# Patient Record
Sex: MALE | NOT HISPANIC OR LATINO | Employment: OTHER | URBAN - METROPOLITAN AREA
[De-identification: names, ages, dates, MRNs, and addresses within clinical notes are randomized per-mention and may not be internally consistent; named-entity substitution may affect disease eponyms.]

---

## 2017-10-17 ENCOUNTER — OFFICE VISIT (OUTPATIENT)
Dept: FAMILY MEDICINE | Facility: CLINIC | Age: 67
End: 2017-10-17
Payer: MEDICARE

## 2017-10-17 VITALS
TEMPERATURE: 96 F | HEIGHT: 69 IN | SYSTOLIC BLOOD PRESSURE: 130 MMHG | RESPIRATION RATE: 16 BRPM | WEIGHT: 177.5 LBS | DIASTOLIC BLOOD PRESSURE: 82 MMHG | BODY MASS INDEX: 26.29 KG/M2 | HEART RATE: 81 BPM | OXYGEN SATURATION: 98 %

## 2017-10-17 DIAGNOSIS — I10 HYPERTENSION, UNSPECIFIED TYPE: Primary | ICD-10-CM

## 2017-10-17 DIAGNOSIS — Z23 NEED FOR PROPHYLACTIC VACCINATION AGAINST STREPTOCOCCUS PNEUMONIAE (PNEUMOCOCCUS): ICD-10-CM

## 2017-10-17 DIAGNOSIS — J30.2 SEASONAL ALLERGIC RHINITIS, UNSPECIFIED CHRONICITY, UNSPECIFIED TRIGGER: ICD-10-CM

## 2017-10-17 DIAGNOSIS — E78.5 HYPERLIPIDEMIA, UNSPECIFIED HYPERLIPIDEMIA TYPE: ICD-10-CM

## 2017-10-17 PROCEDURE — 90670 PCV13 VACCINE IM: CPT | Mod: PBBFAC,PO

## 2017-10-17 PROCEDURE — G0008 ADMIN INFLUENZA VIRUS VAC: HCPCS | Mod: PBBFAC,PO

## 2017-10-17 PROCEDURE — G0009 ADMIN PNEUMOCOCCAL VACCINE: HCPCS | Mod: PBBFAC,PO

## 2017-10-17 PROCEDURE — 99999 PR PBB SHADOW E&M-NEW PATIENT-LVL IV: CPT | Mod: PBBFAC,,, | Performed by: FAMILY MEDICINE

## 2017-10-17 PROCEDURE — 99204 OFFICE O/P NEW MOD 45 MIN: CPT | Mod: PBBFAC,PO | Performed by: FAMILY MEDICINE

## 2017-10-17 PROCEDURE — 99204 OFFICE O/P NEW MOD 45 MIN: CPT | Mod: 25,S$PBB,, | Performed by: FAMILY MEDICINE

## 2017-10-17 RX ORDER — GABAPENTIN 100 MG/1
CAPSULE ORAL 3 TIMES DAILY
COMMUNITY
Start: 2017-10-02

## 2017-10-17 RX ORDER — HYDROCHLOROTHIAZIDE 12.5 MG/1
12.5 TABLET ORAL DAILY
COMMUNITY

## 2017-10-17 RX ORDER — VALSARTAN 320 MG/1
320 TABLET ORAL
COMMUNITY

## 2017-10-17 RX ORDER — ATORVASTATIN CALCIUM 20 MG/1
TABLET, FILM COATED ORAL NIGHTLY
COMMUNITY
Start: 2017-09-01

## 2017-10-17 RX ORDER — AMITRIPTYLINE HYDROCHLORIDE 25 MG/1
25 TABLET, FILM COATED ORAL
COMMUNITY

## 2017-10-17 RX ORDER — PANTOPRAZOLE SODIUM 40 MG/1
TABLET, DELAYED RELEASE ORAL
COMMUNITY
Start: 2017-10-02

## 2017-10-17 RX ORDER — MONTELUKAST SODIUM 10 MG/1
TABLET ORAL NIGHTLY
COMMUNITY
Start: 2017-09-01 | End: 2017-11-02

## 2017-10-17 RX ORDER — METOPROLOL TARTRATE 25 MG/1
25 TABLET, FILM COATED ORAL DAILY
COMMUNITY
End: 2017-10-17 | Stop reason: SINTOL

## 2017-10-17 RX ORDER — AMLODIPINE BESYLATE 5 MG/1
5 TABLET ORAL DAILY
Qty: 30 TABLET | Refills: 5 | Status: SHIPPED | OUTPATIENT
Start: 2017-10-17 | End: 2018-10-17

## 2017-10-17 NOTE — PROGRESS NOTES
Subjective:       Patient ID: Donis Jones is a 67 y.o. male.    Chief Complaint: Establish Care    Mr. Jones comes in today as a new patient to establish care with me.  He states he has been previously followed by PCP/NP Monet Regalado with [facility] with last visit on September 1, 2017.      He states he follows with Jonn Dee-Dr. Damien Ray, ENT, whom he states told him/recommended he not take beta blockers or alpha blockers due to rhinitis and advised him he suspected metoprolol may be the cause for his rhinitis.  He reports having headache for at least 3 months due to sinus symptoms.  He reports associated fatigue, reddened, sore watery and itchy eyes, rare sneezing and sore throat.  He denies fever, chills, appetite change; other sinus symptoms; shortness of breath, cough, wheezing; chest pain, palpitations, leg swelling; abdominal pain, nausea, vomiting, diarrhea, constipation; unusual urinary symptoms; back pain; anxiety, depression, homicidal or suicidal thoughts; hot or cold intolerance.    He has lab results with him which shows the following:  September 1, 2017 - WBC 4.8, hemoglobin 16, hematocrit 47.8, platelet count 225,000, glucose 91, BUN 20, creatinine 1.20, sodium 139, potassium 4.2, chloride 100, CO2 32, calcium 10, total protein 7.9, albumin 4.9, globulin 3.0, alkaline phosphatase 73, ALT 24, AST 19, total bilirubin 0.9, magnesium 2.3, total cholesterol 266, triglyceride 89, HDL 71,  (not compliant with Lipitor therapy), EGFR 74.    Past Medical History:  No date: DDD (degenerative disc disease), lumbar  No date: GERD (gastroesophageal reflux disease)  No date: High cholesterol  No date: HTN (hypertension)  No date: Insomnia  08/2012: Prostate cancer - follows with urologist Dr. Connors  No date: Seasonal allergies      Current Outpatient Prescriptions:  amitriptyline (ELAVIL) 25 MG tablet, Take 25 mg by mouth.  atorvastatin (LIPITOR) 20 MG tablet, nightly.  gabapentin  (NEURONTIN) 100 MG capsule, 3 (three) times daily.  hydrochlorothiazide (HYDRODIURIL) 12.5 MG Tab, Take 12.5 mg by mouth once daily.  metoprolol tartrate (LOPRESSOR) 25 MG tablet, Take 25 mg by mouth once daily.  montelukast (SINGULAIR) 10 mg tablet, nightly.  pantoprazole (PROTONIX) 40 MG tablet,   valsartan (DIOVAN) 320 MG tablet, Take 320 mg by mouth.            Review of Systems   Constitutional: Positive for fatigue. Negative for activity change, appetite change, chills and fever.   HENT: Positive for congestion, postnasal drip, sinus pressure, sneezing and sore throat. Negative for rhinorrhea.    Eyes: Positive for pain, discharge, redness and itching.   Respiratory: Negative for cough, shortness of breath, wheezing and stridor.    Cardiovascular: Negative for chest pain, palpitations and leg swelling.   Gastrointestinal: Negative for abdominal pain, constipation, diarrhea, nausea and vomiting.   Endocrine: Negative for cold intolerance and heat intolerance.   Genitourinary: Negative for difficulty urinating.   Musculoskeletal: Negative for back pain.   Neurological: Positive for headaches.   Psychiatric/Behavioral: Negative for dysphoric mood and suicidal ideas. The patient is not nervous/anxious.         Negative for homicidal ideas.       Objective:      Physical Exam   Constitutional: He is oriented to person, place, and time. He appears well-developed and well-nourished. No distress.   Pleasant.   HENT:   Head: Normocephalic and atraumatic.   Right Ear: External ear normal.   Left Ear: External ear normal.   Nose: Nose normal.   Mouth/Throat: Oropharynx is clear and moist. No oropharyngeal exudate.   Slightly tender frontal sinus.  TM's shiny and clear. Nasal mucosa pink, slightly congested without drainage noted.   Eyes: Conjunctivae and EOM are normal. Pupils are equal, round, and reactive to light. Right eye exhibits no discharge. Left eye exhibits no discharge.   Neck: Normal range of motion. Neck  supple. No thyromegaly present.   Cardiovascular: Normal rate, regular rhythm, normal heart sounds and intact distal pulses.    No murmur heard.  Pulmonary/Chest: Effort normal and breath sounds normal. No respiratory distress. He has no wheezes.   Abdominal: Soft. Bowel sounds are normal. He exhibits no distension and no mass. There is no tenderness. There is no guarding.   Musculoskeletal: Normal range of motion. He exhibits no edema or tenderness.   He is ambulatory without problems.   Lymphadenopathy:     He has no cervical adenopathy.   Neurological: He is alert and oriented to person, place, and time.   Skin: He is not diaphoretic.   Psychiatric: He has a normal mood and affect. His behavior is normal. Judgment and thought content normal.   Vitals reviewed.      Assessment:       1. Hypertension, unspecified type    2. Hyperlipidemia, unspecified hyperlipidemia type    3. Seasonal allergic rhinitis, unspecified chronicity, unspecified trigger    4. Need for prophylactic vaccination against Streptococcus pneumoniae (pneumococcus)        Plan:       1.  Stop Metoprolol.  Start Amlodipine 5 mg daily, #30, 5 refills; medication precautions discussed with patient.  2.  Continue current medications, follow low sodium, low cholesterol, low carb diet, daily walks.  3.  High dose flu shot today.  4.  Prevnar-13 shot today. Pneumovax in 1 year.  5.  See me for hyperlipidemia and hypertension follow up (at same time as follow up with Dr. Connors).

## 2017-11-02 ENCOUNTER — OFFICE VISIT (OUTPATIENT)
Dept: FAMILY MEDICINE | Facility: CLINIC | Age: 67
End: 2017-11-02
Payer: MEDICARE

## 2017-11-02 VITALS
SYSTOLIC BLOOD PRESSURE: 121 MMHG | OXYGEN SATURATION: 96 % | RESPIRATION RATE: 16 BRPM | DIASTOLIC BLOOD PRESSURE: 81 MMHG | TEMPERATURE: 97 F | HEIGHT: 69 IN | HEART RATE: 91 BPM | BODY MASS INDEX: 26.48 KG/M2 | WEIGHT: 178.81 LBS

## 2017-11-02 DIAGNOSIS — J31.0 CHRONIC RHINITIS, UNSPECIFIED TYPE: Primary | ICD-10-CM

## 2017-11-02 DIAGNOSIS — J34.89 SINUS PAIN: ICD-10-CM

## 2017-11-02 DIAGNOSIS — I10 ESSENTIAL HYPERTENSION: ICD-10-CM

## 2017-11-02 PROCEDURE — 99214 OFFICE O/P EST MOD 30 MIN: CPT | Mod: S$PBB,,, | Performed by: FAMILY MEDICINE

## 2017-11-02 PROCEDURE — 99999 PR PBB SHADOW E&M-EST. PATIENT-LVL III: CPT | Mod: PBBFAC,,, | Performed by: FAMILY MEDICINE

## 2017-11-02 PROCEDURE — 99213 OFFICE O/P EST LOW 20 MIN: CPT | Mod: PBBFAC,PO | Performed by: FAMILY MEDICINE

## 2017-11-02 RX ORDER — IPRATROPIUM BROMIDE 42 UG/1
2 SPRAY, METERED NASAL 3 TIMES DAILY
Qty: 15 ML | Refills: 2 | Status: SHIPPED | OUTPATIENT
Start: 2017-11-02 | End: 2017-12-02

## 2017-11-02 RX ORDER — LACTULOSE 10 G/15ML
SOLUTION ORAL; RECTAL
COMMUNITY
Start: 2017-10-17

## 2017-11-02 NOTE — PROGRESS NOTES
Subjective:      Patient ID: Donis Jones is a 67 y.o. male.    Chief Complaint: Sinus Problem and Headache      Past Medical History:   Diagnosis Date    DDD (degenerative disc disease), lumbar     GERD (gastroesophageal reflux disease)     High cholesterol     HTN (hypertension)     Insomnia     Prostate cancer 08/2012    Follows with urologist Dr. Connors    Seasonal allergies      Past Surgical History:   Procedure Laterality Date    BACK SURGERY      PROSTATECTOMY  09/2012    SPINE SURGERY  2003    Removed L5     Family History   Problem Relation Age of Onset    Diabetes Mother     Heart disease Mother     Liver disease Father     No Known Problems Sister     No Known Problems Sister     No Known Problems Sister     No Known Problems Sister     No Known Problems Daughter     No Known Problems Son      Social History     Social History    Marital status: Single     Spouse name: N/A    Number of children: 2    Years of education: N/A     Occupational History    Retired       Social History Main Topics    Smoking status: Former Smoker     Quit date: 10/17/2007    Smokeless tobacco: Never Used    Alcohol use Yes      Comment: occasional    Drug use: No    Sexual activity: Yes     Partners: Female     Birth control/ protection: Condom     Other Topics Concern    Not on file     Social History Narrative    No narrative on file       Current Outpatient Prescriptions:     amitriptyline (ELAVIL) 25 MG tablet, Take 25 mg by mouth., Disp: , Rfl:     amlodipine (NORVASC) 5 MG tablet, Take 1 tablet (5 mg total) by mouth once daily., Disp: 30 tablet, Rfl: 5    atorvastatin (LIPITOR) 20 MG tablet, nightly., Disp: , Rfl:     gabapentin (NEURONTIN) 100 MG capsule, 3 (three) times daily., Disp: , Rfl:     hydrochlorothiazide (HYDRODIURIL) 12.5 MG Tab, Take 12.5 mg by mouth once daily., Disp: , Rfl:     pantoprazole (PROTONIX) 40 MG tablet, , Disp: , Rfl:     valsartan  "(DIOVAN) 320 MG tablet, Take 320 mg by mouth., Disp: , Rfl:     ipratropium (ATROVENT) 0.06 % nasal spray, 2 sprays by Nasal route 3 (three) times daily., Disp: 15 mL, Rfl: 2    lactulose (CHRONULAC) 10 gram/15 mL solution, , Disp: , Rfl:   Review of patient's allergies indicates:  No Known Allergies    Review of Systems   Constitutional: Negative for chills and fever.   HENT: Positive for postnasal drip and sinus pain. Negative for ear pain, hearing loss and nosebleeds.    Respiratory: Negative for cough, shortness of breath and wheezing.    Cardiovascular: Negative for chest pain and palpitations.   Gastrointestinal: Negative for abdominal pain and blood in stool.   Neurological: Positive for headaches.     Sinus Problem   Associated symptoms include headaches. Pertinent negatives include no chills, coughing, ear pain or shortness of breath.   Headache    Pertinent negatives include no abdominal pain, coughing, ear pain, fever or hearing loss.     Over 4 years of sinus pain.  Had a sinus procedure done in [place] 4 years ago and chronic pain in sinuses since that time.  Multiple steroid and abx without improvement.  He is here today to discuss.  He does not have fever, but does have some feeling hot/cold at times.  Feels like he is fighting of a cold frequently.  Pain in bilateral maxillary sinuses persistent and constant.  Rhinitis persistent.  Multiple allergy tests negative per patient.  He is still following in [facility].    Objective:   /81 (BP Location: Right arm, Patient Position: Sitting, BP Method: Small (Manual))   Pulse 91   Temp 97.3 °F (36.3 °C) (Tympanic)   Resp 16   Ht 5' 9" (1.753 m)   Wt 81.1 kg (178 lb 12.7 oz)   SpO2 96%   BMI 26.40 kg/m²      Physical Exam   Constitutional: He is oriented to person, place, and time. He is cooperative. No distress.   HENT:   Right Ear: Hearing, tympanic membrane, external ear and ear canal normal.   Left Ear: Hearing, " tympanic membrane, external ear and ear canal normal.   Mouth/Throat: Uvula is midline, oropharynx is clear and moist and mucous membranes are normal.   Eyes: Conjunctivae and EOM are normal.   Cardiovascular: Normal rate, regular rhythm and normal heart sounds.    Pulmonary/Chest: Effort normal and breath sounds normal.   Musculoskeletal: He exhibits no edema.   Neurological: He is alert and oriented to person, place, and time. Coordination and gait normal.   Skin: Skin is warm, dry and intact. No rash noted. He is not diaphoretic. No erythema.   Psychiatric: He has a normal mood and affect. His speech is normal and behavior is normal.   Nursing note and vitals reviewed.          Assessment:       1. Chronic rhinitis, unspecified type    2. Sinus pain    3. Essential hypertension            Plan:         Chronic rhinitis, unspecified type  Comments:  Chronic postnasal drip not helped with nasacort or singulair.  Has seen multiple specialists and allergiests.  Trial of atrovent nasal spray.    Sinus pain  Comments:  4 years of chronic sinus pain since prior sinus surgery.  Seen multiple specialists.  Discussed changing rhinitis medication.  Focus on health.    Essential hypertension  Comments:  BP's well controlled.    Other orders  -     ipratropium (ATROVENT) 0.06 % nasal spray; 2 sprays by Nasal route 3 (three) times daily.  Dispense: 15 mL; Refill: 2        Patient Care Team:  Primary Doctor No as PCP - General    Return in about 3 months (around 2/2/2018) for review of bp/meds.

## 2017-11-02 NOTE — PATIENT INSTRUCTIONS
Mediterranean Food Guide   People who live in the area around the [place] have a lower risk of heart disease. Researchers have recently shown that following a Mediterranean diet decreases heart disease by 30% in people whom are at-risk.   This lifestyle is built upon daily exercise along with a lot of fruit, vegetables, plant-based proteins, whole grains, fish and smaller amounts of poultry and red meat. Fatty fish (salmon), olive oil, and nuts make this diet higher in fat than the classic heart healthy diet. These fats are mostly unsaturated, and when consumed in place of saturated fat, are good for the heart. The pyramid above and the chart on the next page describe the types of food and serving sizes in this heart healthy meal plan.   Physical Activity- The Mediterranean Diet pyramid is built upon daily physical activity and exercise. Aim for at least 150 minutes of moderate to vigorous exercise every week. Moderate-to-vigorous exercises include walking at a brisk pace, biking, or swimming, among other activities that elevate your heart rate. Always choose activities that you enjoy and that are safe, in order to be active throughout your life.   Achieve and Maintain a Healthy Weight - The high fat content of the Mediterranean is healthy for your heart, but may lead to increased energy intake and weight gain if you dont pay attention to how much you are eating. If you are trying to lose weight, choose the smaller number of servings from each food group, and try to make your serving sizes match those listed. 2   Food Groups and Servings per day  Serving Sizes    Non-starchy Vegetables   4-8 servings per day  One serving is ½ cup of cooked vegetables or 1 cup raw vegetables   Non-starchy vegetables include artichoke, asparagus, beets, broccoli, [place] sprouts, cauliflower, cabbage, celery, carrots, tomatoes, eggplant, cucumber, onion, green and wax beans, zucchini, turnips, peppers, salad greens  and mushrooms. (Potatoes, peas, and corn are starchy vegetables.)    Fruit   2-4 servings per day  One serving is a small fresh fruit or ½ cup juice or ¼ cup dried fruit   Fresh fruits are preferred because of the fiber and other nutrients they contain. Fruits canned in light syrup or their own juice, and frozen fruit with little or no added sugar are also good choices. Use only small amounts of fruit juice (6 oz per day or less), since even unsweetened juices can contain as much sugar as regular soda.    Legumes and Nuts   1-3 servings per day  Legumes: One serving is ½ cup cooked kidney, black, garbanzo, [place], soy (edamame), navy beans, split peas, or lentils, or ¼ cup fat free refried beans or baked beans   Nuts and Seeds: One serving is 2 Tbsp. sunflower or sesame seeds, 1 Tbsp. peanut butter,   7-8 walnuts or pecans, 20 peanuts, or 12-15 almonds   Aim for 1-2 servings of nuts or seeds and 1-2 servings of legumes per day. Legumes are high in fiber, protein, and minerals. Nuts are high in unsaturated fat, and may increase HDL without increasing LDL cholesterol levels.    Low-fat Dairy Products   1-3 servings per day  One serving is 1 cup of skim milk, non-fat yogurt, or 1oz of low-fat (part-skim) cheese   Calcium-fortified soy milk, soy yogurt, and soy cheese can take the place of dairy products. If servings of dairy or fortified soy are less than 2 per day, we advise a calcium and vitamin D supplement.    Fish or shellfish   2-3 times a week  One serving is 3 ounces (about the size of a deck of cards)   Cook fish by baking, sautéing, broiling, roasting, grilling, or poaching. Choose fatty fishes like salmon, herring, sardines, or mackerel often. The fat in fish is high in omega-3 fats, so it has healthy effects on triglycerides and blood cells.    Poultry, if desired   1-3 times a week  One serving is 3 ounces (about the size of a deck of cards)   Bake, sauté, stir garcia, roast, or grill the poultry you eat, and  eat it without the skin.    Whole Grains and Starchy Vegetables   4-6 servings per day  One serving is about 1 ounce of any of these:   1 slice whole wheat bread ½ cup potatoes, sweet potatoes, corn, or peas   ½ large whole grain bun 1 small whole grain roll   6-inch whole wheat krysta 6 whole grain crackers   ½ cup cooked whole grain cereal (oatmeal, cracked wheat, quinoa)   ½ cup cooked whole wheat pasta, brown rice, or barley   Whole grains are high in fiber and have less effect on blood sugar and triglyceride levels than refined, processed grains like white bread and pasta. Whole grains also keep the stomach full longer, making it easier to control hunger.

## 2018-01-19 ENCOUNTER — PATIENT OUTREACH (OUTPATIENT)
Dept: ADMINISTRATIVE | Facility: HOSPITAL | Age: 68
End: 2018-01-19